# Patient Record
Sex: FEMALE | Race: WHITE | NOT HISPANIC OR LATINO | URBAN - METROPOLITAN AREA
[De-identification: names, ages, dates, MRNs, and addresses within clinical notes are randomized per-mention and may not be internally consistent; named-entity substitution may affect disease eponyms.]

---

## 2021-09-28 ENCOUNTER — TRANSFERRED RECORDS (OUTPATIENT)
Dept: HEALTH INFORMATION MANAGEMENT | Facility: CLINIC | Age: 41
End: 2021-09-28

## 2021-10-05 ENCOUNTER — TRANSFERRED RECORDS (OUTPATIENT)
Dept: HEALTH INFORMATION MANAGEMENT | Facility: CLINIC | Age: 41
End: 2021-10-05

## 2021-10-07 ENCOUNTER — TRANSFERRED RECORDS (OUTPATIENT)
Dept: HEALTH INFORMATION MANAGEMENT | Facility: CLINIC | Age: 41
End: 2021-10-07

## 2021-10-08 ENCOUNTER — OFFICE VISIT (OUTPATIENT)
Dept: SURGERY | Facility: CLINIC | Age: 41
End: 2021-10-08
Payer: COMMERCIAL

## 2021-10-08 VITALS
SYSTOLIC BLOOD PRESSURE: 122 MMHG | DIASTOLIC BLOOD PRESSURE: 88 MMHG | WEIGHT: 185 LBS | OXYGEN SATURATION: 98 % | HEIGHT: 66 IN | HEART RATE: 87 BPM | BODY MASS INDEX: 29.73 KG/M2

## 2021-10-08 DIAGNOSIS — D24.1 FIBROADENOMA OF BREAST, RIGHT: ICD-10-CM

## 2021-10-08 PROCEDURE — 99203 OFFICE O/P NEW LOW 30 MIN: CPT | Performed by: SURGERY

## 2021-10-08 ASSESSMENT — MIFFLIN-ST. JEOR: SCORE: 1530.9

## 2021-10-08 NOTE — NURSING NOTE
Breast Patients    BREAST PATIENTS (ALL)    1-Do you have any of the following symptoms? Lump(s) or Mass(es)  2-In which breast are you having the symptoms? right  3-Have you had a Mammogram? Other Location:  [facility]    -  Date:  10/5/2021  4-Have you ever had a breast cyst drained? No  5-Have you ever had a breast biopsy? Yes:  Right  -  Date:  10/5/2021  6-Have you ever had a Breast Cancer? No   7-Is there a history of Breast Cancer in your family? Yes   Relationship to you:    Aunt  8-Have you ever had Ovarian Cancer? No  9-Is there a history of Ovarian Cancer in your family? No  10-Summarize your caffeine intake (i.e. coffee, tea, chocolate, soda etc.): Coffee daily, tea and chocolate    BREAST PATIENTS (FEMALE)    11-What age did your periods begin? 14  12-Date your last menstrual period began? 10/06/2021  13-Number of full-term pregnancies: 0  14-Your age when your first child was born? N/A  15-Did you nurse your children? N/A  16-Are you pregnant now? No  17-Have you begun menopause? No  18-Have you had either ovary removed?No  19-Do you have breast implants? No   20-Do you use hormone replacement therapy?  No  21-Have you taken oral contraceptive pills?  No  22-Have you had an intrauterine device (IUD) placed?  No  23-What is your current bra size?  34B

## 2021-10-08 NOTE — LETTER
October 11, 2021          Aileen Andrewsdebbie Raul, APRN CNP  [facility]  [address]  [place] [address]      RE:   Cathy Harkins 1980      Dear Colleague,    Thank you for referring your patient, Cathy Harkins, to [facility] at [facility]. Please see a copy of my visit note below.    History of Present Illness:  Cathy Harkins is a 41 year old female who presented with a palpable right breast mass.  This was identified by the patient's primary care provider and she was sent on for imaging.  Mammogram and ultrasound suggested a pair of sizable lesions in the right central breast.  These did not have particularly worrisome ultrasonographic characteristics but the size warranted biopsy.  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  It was noted on the pathology report that sampling error does not rule out phyllodes tumor.  Patient does not have a strong family history of breast cancer and has never had previous breast issues.  She is nulliparous.  She is here to discuss the relevance of these findings.     PMH:  Cathy Harkins  has no past medical history on file.  PSH:  Cathy Harkins  has no past surgical history on file.     Home medications and allergies reviewed.     Social History:  Cathy Harkins  reports that she has never smoked. She has never used smokeless tobacco. She reports current alcohol use. She reports that she does not use drugs.  Family History:  Cathy Harkins family history includes Cerebrovascular Disease in her paternal grandmother; Coronary Artery Disease in her father, maternal grandmother, and paternal grandmother; Diabetes in her maternal grandfather; Hypertension in her father; Prostate Cancer in her paternal grandfather; Stomach Cancer in her paternal grandfather; Thyroid Disease in her mother.     ROS:  The 10 point Review of Systems is negative other than noted in the HPI.  No fevers or chills.  No recent weight loss or weight  "gain.     Physical Exam:  Blood pressure 122/88, pulse 87, height 1.676 m (5' 6\"), weight 83.9 kg (185 lb), SpO2 98 %.  185 lbs 0 oz  Thin healthy young female in no distress.  Patient has a pleasant affect and communicates well.   Pupils equal round and reactive to light.   No cervical lymphadenopathy or thyromegaly.   Lung fields clear, breathing comfortably.   Heart normal sinus rhythm.  No murmurs rubs or gallops.  Bilateral breast exam performed.  Moderately sized dense breast bilaterally.  No surgical scars.  Easily palpable central masses in the right breast, freely mobile nontender.  No skin changes or nipple discharge.  Skin warm, dry.  No obvious rashes or lesions.     All new lab and imaging data was reviewed.  This includes outside imaging reports, imaging studies, and pathology reports.      Assessment/plan: Pleasant healthy 41-year-old female with fairly large right breast mass x2.  Each of these masses measures approximately 3.5 cm in greatest dimension.  Biopsy suggested fibroadenoma but phyllodes tumor could not be completely ruled out.  These masses are asymptomatic for the patient and not particularly clinically apparent.  I think surgical excision of both these areas with significantly decreased the size of her breast.  My recommendation would be repeat ultrasound in 6 months to look for any significant change in size.  This might be performed every 6 months for the next 1 to 2 years to confirm stability of these areas.  Any increase in size might warrant surgical excision.             Again, thank you for allowing me to participate in the care of your patient.      Sincerely,      Cb Laureano MD      "

## 2021-10-11 PROBLEM — D24.1 FIBROADENOMA OF BREAST, RIGHT: Status: ACTIVE | Noted: 2021-10-11

## 2021-10-11 NOTE — PROGRESS NOTES
Surgery Consultation, [facility], JASEN Laureano MD, MD    Cathy Harkins MRN# [id number]   YOB: 1980 Age: 41 year old     PCP:  Aileen Carter [phone]    Chief Complaint: Right breast mass    Pt was seen in consultation from Aileen Carter.    History of Present Illness:  Cathy Harkins is a 41 year old female who presented with a palpable right breast mass.  This was identified by the patient's primary care provider and she was sent on for imaging.  Mammogram and ultrasound suggested a pair of sizable lesions in the right central breast.  These did not have particularly worrisome ultrasonographic characteristics but the size warranted biopsy.  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  It was noted on the pathology report that sampling error does not rule out phyllodes tumor.  Patient does not have a strong family history of breast cancer and has never had previous breast issues.  She is nulliparous.  She is here to discuss the relevance of these findings.    PMH:  Cathy Harkins  has no past medical history on file.  PSH:  Cathy Harkins  has no past surgical history on file.    Home medications and allergies reviewed.    Social History:  Cathy Harkins  reports that she has never smoked. She has never used smokeless tobacco. She reports current alcohol use. She reports that she does not use drugs.  Family History:  Cathy Harkins family history includes Cerebrovascular Disease in her paternal grandmother; Coronary Artery Disease in her father, maternal grandmother, and paternal grandmother; Diabetes in her maternal grandfather; Hypertension in her father; Prostate Cancer in her paternal grandfather; Stomach Cancer in her paternal grandfather; Thyroid Disease in her mother.    ROS:  The 10 point Review of Systems is negative other than noted in the HPI.  No fevers or chills.  No recent weight loss or weight gain.    Physical  "Exam:  Blood pressure 122/88, pulse 87, height 1.676 m (5' 6\"), weight 83.9 kg (185 lb), SpO2 98 %.  185 lbs 0 oz  Thin healthy young female in no distress.  Patient has a pleasant affect and communicates well.   Pupils equal round and reactive to light.   No cervical lymphadenopathy or thyromegaly.   Lung fields clear, breathing comfortably.   Heart normal sinus rhythm.  No murmurs rubs or gallops.  Bilateral breast exam performed.  Moderately sized dense breast bilaterally.  No surgical scars.  Easily palpable central masses in the right breast, freely mobile nontender.  No skin changes or nipple discharge.  Skin warm, dry.  No obvious rashes or lesions.    All new lab and imaging data was reviewed.  This includes outside imaging reports, imaging studies, and pathology reports.     Assessment/plan: Pleasant healthy 41-year-old female with fairly large right breast mass x2.  Each of these masses measures approximately 3.5 cm in greatest dimension.  Biopsy suggested fibroadenoma but phyllodes tumor could not be completely ruled out.  These masses are asymptomatic for the patient and not particularly clinically apparent.  I think surgical excision of both these areas with significantly decreased the size of her breast.  My recommendation would be repeat ultrasound in 6 months to look for any significant change in size.  This might be performed every 6 months for the next 1 to 2 years to confirm stability of these areas.  Any increase in size might warrant surgical excision.      Gustavo Laureano M.D.  [facility]  [phone]    Please route or send letter to:  Primary Care Provider (PCP) and Referring Provider  "

## 2022-04-14 DIAGNOSIS — D24.1 FIBROADENOMA OF BREAST, RIGHT: Primary | ICD-10-CM
